# Patient Record
(demographics unavailable — no encounter records)

---

## 2024-12-12 NOTE — ASSESSMENT
[FreeTextEntry1] : 48 year F present with Left Middle ear effusion, bilateral SNHL, Bilateral tinnitus   9/6/24 Audiogram shows AD Mild SNHL 250-4kHz sloping to severe SNHL at 6kHz. No response at 8kHz. AS Moderate to severe mixed hearing loss 250-8kHz. AU Tymp A  9/6/24 Nasal endoscopy shows S-Shaped DNS, Moderate Inferior Turbinates Hypertrophy, No polyps, purulence or masses, Posterior Nasal pharynx Cobblestone/Clear Drainage, Moderate mucus production coating nasal cavity, Bilateral Eustachian tube opening clear  Personally reviewed Audiogram shows AU Hearing -4k hz sloping severe HF SNHL through 8k hz. AD Tymp Ad. AS Tymp A. Tinnitus Match AU 25db@3k hz  Recommend: Left Middle Ear Effusion -Resolved - Discussed at length that ear fluid itself is a result of a mechanical problem due to swelling and inflammation after URIs/Ear infections and that if not infected fluid that we often don't treat with antibiotics. -The underlying issues is Eustachian tube dysfunction which can be transient in which we just wait for viral illnesses to run their course. If the ETD is chronic that is when we discuss possible ear tubes. -Continue Flonase nasal sprays to decrease possible inflammation at the open of the eustachian tube on the side of the nose to allow for better drainage  SNHL -Discussed Benefit of Hearings Aids and their value of helping keep brain stimulated by helping hear conversation which keeps a person active and socially connected. Stressed also the association with a lower risk of incident dementia and slower cognitive decline. -[facility] Hearing Aid Evaluation and List of hearing aid Dispensary Given   Bilateral Tinnitus -Discussed that Hearing Aids may help with relieving some of the tinnitus. Tinnitus usually follows the same pattern of hearing loss and can be attributed to phantom sounds in the brain filling in for the loss of hearing in those particular frequencies -Discussed that Tinnitus usually can be attributed to phantom sounds in the brain filling in for sounds. If the tinnitus is brief only last for a few seconds with no loss of hearing associated. It is usually physiological and can be management conservatively -Discussed notched therapy, masking therapy, cognitive behavioral therapy, factors that may influence tinnitus, and discussed limited benefit of tinnitus supplements. -Tinnitus Hand Out Given -Patient states will try white noise machine   -Return to clinic 2 month or sooner if new/worsen symptoms present

## 2024-12-12 NOTE — REASON FOR VISIT
[Subsequent Evaluation] : a subsequent evaluation for [Family Member] : family member [FreeTextEntry2] : left ear infection

## 2024-12-12 NOTE — DATA REVIEWED
[de-identified] : An audiogram was ordered and performed including pure tones, tympanometry and speech testing for the patients complaint of hearing loss I have independently reviewed the patient's audiogram from today and my findings include  AU Hearing -4k hz sloping severe HF SNHL through 8k hz. AD Tymp Ad. AS Tymp A. Tinnitus Match AU 25db@3k hz

## 2024-12-12 NOTE — HISTORY OF PRESENT ILLNESS
[de-identified] : 48 year female follow up for Left DEION Patient hx 2004- earing stud getting stuck in skin of Left ear. Sates using Flonase and sinus rinse daily States ear fullness and otalgia improved States continues to have intermittent tinnitus, mild decreased hearing  Patient denies otorrhea, bleeding, hearing loss.

## 2025-03-26 NOTE — HISTORY OF PRESENT ILLNESS
[de-identified] : 48 year F following up for bilateral SNHL, Bilateral tinnitus Doing well with amplifiers from [employer] for hearing loss and noise cancellation of tinnitus States noise is more of a background noise now. Doesn't bother her as much Denies any recent ear, nose, throat infections

## 2025-03-26 NOTE — REASON FOR VISIT
[Subsequent Evaluation] : a subsequent evaluation for [FreeTextEntry2] : bilateral SNHL, Bilateral tinnitus

## 2025-03-26 NOTE — HISTORY OF PRESENT ILLNESS
[de-identified] : 48 year F following up for bilateral SNHL, Bilateral tinnitus Doing well with amplifiers from [employer] for hearing loss and noise cancellation of tinnitus States noise is more of a background noise now. Doesn't bother her as much Denies any recent ear, nose, throat infections

## 2025-03-26 NOTE — ASSESSMENT
[FreeTextEntry1] : 48 year F follow up for with bilateral SNHL, Bilateral tinnitus   9/6/24 Audiogram shows AD Mild SNHL 250-4kHz sloping to severe SNHL at 6kHz. No response at 8kHz. AS Moderate to severe mixed hearing loss 250-8kHz. AU Tymp A  9/6/24 Nasal endoscopy shows S-Shaped DNS, Moderate Inferior Turbinates Hypertrophy, No polyps, purulence or masses, Posterior Nasal pharynx Cobblestone/Clear Drainage, Moderate mucus production coating nasal cavity, Bilateral Eustachian tube opening clear  12/12/24 Audiogram shows AU Hearing -4k hz sloping severe HF SNHL through 8k hz. AD Tymp Ad. AS Tymp A. Tinnitus Match AU 25db@3k hz  2/17/2025 MRI shows No acute intracranial abnormality. Stable few punctate foci of signal alternation in the parietal lobe white matter, no nonspecific finding, which may be seen with sequalae of migraines  Recommend: SNHL -Discussed Benefit of Hearings Aids and their value of helping keep brain stimulated by helping hear conversation which keeps a person active and socially connected. Stressed also the association with a lower risk of incident dementia and slower cognitive decline. -Patient using amplifiers from [employer] for hearing and noise cancellation of tinnitus. Regular hearing aids too expensive.  Bilateral Tinnitus -Improved -Discussed that Hearing Aids may help with relieving some of the tinnitus. Tinnitus usually follows the same pattern of hearing loss and can be attributed to phantom sounds in the brain filling in for the loss of hearing in those particular frequencies -Discussed that Tinnitus usually can be attributed to phantom sounds in the brain filling in for sounds. If the tinnitus is brief only last for a few seconds with no loss of hearing associated. It is usually physiological and can be management conservatively -Discussed notched therapy, masking therapy, cognitive behavioral therapy, factors that may influence tinnitus, and discussed limited benefit of tinnitus supplements. -Tinnitus Hand Out Given -Patient states will try white noise machine  -Return to clinic annually to monitor hearing loss or sooner if new/worsen symptoms present

## 2025-03-26 NOTE — ASSESSMENT
[FreeTextEntry1] : 48 year F follow up for with bilateral SNHL, Bilateral tinnitus   9/6/24 Audiogram shows AD Mild SNHL 250-4kHz sloping to severe SNHL at 6kHz. No response at 8kHz. AS Moderate to severe mixed hearing loss 250-8kHz. AU Tymp A  9/6/24 Nasal endoscopy shows S-Shaped DNS, Moderate Inferior Turbinates Hypertrophy, No polyps, purulence or masses, Posterior Nasal pharynx Cobblestone/Clear Drainage, Moderate mucus production coating nasal cavity, Bilateral Eustachian tube opening clear  12/12/24 Audiogram shows AU Hearing -4k hz sloping severe HF SNHL through 8k hz. AD Tymp Ad. AS Tymp A. Tinnitus Match AU 25db@3k hz  2/17/2025 MRI shows No acute intracranial abnormality. Stable few punctate foci of signal alternation in the parietal lobe white matter, no nonspecific finding, which may be seen with sequalae of migraines  Recommend: SNHL -Discussed Benefit of Hearings Aids and their value of helping keep brain stimulated by helping hear conversation which keeps a person active and socially connected. Stressed also the association with a lower risk of incident dementia and slower cognitive decline. -Patient using amplifiers from [employer] for hearing and noise cancellation of tinnitus. Regular hearing aids too expensive.  Bilateral Tinnitus -Improved -Discussed that Hearing Aids may help with relieving some of the tinnitus. Tinnitus usually follows the same pattern of hearing loss and can be attributed to phantom sounds in the brain filling in for the loss of hearing in those particular frequencies -Discussed that Tinnitus usually can be attributed to phantom sounds in the brain filling in for sounds. If the tinnitus is brief only last for a few seconds with no loss of hearing associated. It is usually physiological and can be management conservatively -Discussed notched therapy, masking therapy, cognitive behavioral therapy, factors that may influence tinnitus, and discussed limited benefit of tinnitus supplements. -Tinnitus Hand Out Given -Patient states will try white noise machine  -Return to clinic annually to monitor hearing loss or sooner if new/worsen symptoms present

## 2025-05-23 NOTE — ASSESSMENT
[FreeTextEntry1] : A low acid / reflux diet was discussed in great detail including not smoking, not drinking alcohol, and not consuming foods that irritate the esophagus. It is helpful to eat small meals throughout the day instead of large meals. You should avoid eating before bedtime or lying down after you eat. It can be helpful to raise the head of your bed six inches. Additionally, you should maintain a healthy weight and good posture.. The patient was given written material to take home and review.  The patient mentioned feeling BLOATED   A low FODMAP diet was discussed with the patient at length. The patient had multiple questions all of which were answered. I recommended a nutritionist. Also recommended that the patient keep a food diary. We discussed  options such as Vegetables. Fresh fruits. Dairy that is lactose-free, and hard cheeses, or ripened/matured cheeses including... Beef, pork, chicken, fish, eggs. Avoid breadcrumbs, marinades, and sauces/gravies that may be high in FODMAPs. Soy products including tofu, tempeh. Grains.  The Patient mentioned being GASEOUS  We discussed Probiotics and limiting leafy vegetables and other changes  We discussed diarrhea at length. Treatment depends on the cause and severity of your diarrhea. You should drink plenty of fluids to avoid dehydration. You should avoid drinks that contain caffeine and milk. Milk may make diarrhea worse. Your doctor may recommend hydration drinks for your infant or child. People with severe dehydration may need fluid replacement via an IV line and hospitalization. Avoid eating greasy foods, fatty foods, and alcohol. Bananas, applesauce, rice, and toast are helpful foods to eat. If you feel too sick to eat, try sucking on ice chips until you can tolerate food.  EGD procedure ordered The risks benefits alternatives and complications of the procedure/s were explained to the patient at length. The patient was agreeable and we will proceed. Patient will need Internal medicine clearance prior to EGD. She agreed and will obtain.   Patient will need IBS panel including blood work and stool cultures. Instructions provided how to obtain sample and where to return specimen.   Patient will begin taking Omeprazole 40 mg PO in the AM and Famotidine 40 mg PO in the PM. Medications reviewed side effects and adverse reactions  Patient advised if s/s worsen or become severe to seek emergency treatment   Patient verbalized understanding of all information provided. All questions answered and reviewed.  I spent 45 minutes with the patient as well as reviewing documents prior to and after the office visit

## 2025-05-23 NOTE — HISTORY OF PRESENT ILLNESS
[FreeTextEntry1] : 49 year old female presents with complaints of generalized abdominal pain and change in bowel habits. Abdominal pain has been on going for a few weeks now. The pain is accompanied with nausea and vomiting, She has noticed at times her stool is loose than alternating to hard. She had a colonoscopy last year with a [place] [facility] [place] to recall his name or exact location. She remembers the result to be normal. No family hx of colon cancer. Denies rectal bleeding, blood in the stool, melena, or hematemesis. PMX Asthma that is controlled by her PCP Dr Katie Martinez in [place] on [facility]. She does admit to consuming several cups of coffee daily that may contribute to abdominal pain. She has never had a EGD.

## 2025-05-23 NOTE — HISTORY OF PRESENT ILLNESS
[FreeTextEntry1] : 49 year old female presents with complaints of generalized abdominal pain and change in bowel habits. Abdominal pain has been on going for a few weeks now. The pain is accompanied with nausea and vomiting, She has noticed at times her stool is loose than alternating to hard. She had a colonoscopy last year with a GI provider in [facility] [place] unable to recall his name or exact location. She remembers the result to be normal. No family hx of colon cancer. Denies rectal bleeding, blood in the stool, melena, or hematemesis. PMX Asthma that is controlled by her PCP Dr Katie Martinez in [place] on [facility]. She does admit to consuming several cups of coffee daily that may contribute to abdominal pain. She has never had a EGD.

## 2025-05-23 NOTE — PHYSICAL EXAM
[Alert] : alert [Normal Voice/Communication] : normal voice/communication [Healthy Appearing] : healthy appearing [No Acute Distress] : no acute distress [Sclera] : the sclera and conjunctiva were normal [Hearing Threshold Finger Rub Not [place]] : hearing was normal [Normal Lips/Gums] : the lips and gums were normal [Oropharynx] : the oropharynx was normal [Normal Appearance] : the appearance of the neck was normal [No Neck Mass] : no neck mass was observed [No Respiratory Distress] : no respiratory distress [No Acc Muscle Use] : no accessory muscle use [Respiration, Rhythm And Depth] : normal respiratory rhythm and effort [Auscultation Breath Sounds / Voice Sounds] : lungs were clear to auscultation bilaterally [Heart Rate And Rhythm] : heart rate was normal and rhythm regular [Normal S1, S2] : normal S1 and S2 [Murmurs] : no murmurs [Bowel Sounds] : normal bowel sounds [No Masses] : no abdominal mass palpated [Abdomen Soft] : soft [] : no hepatosplenomegaly [Other: ___] : [unfilled] [Oriented To Time, Place, And Person] : oriented to person, place, and time

## 2025-06-23 NOTE — PHYSICAL EXAM
[Alert] : alert [Normal Voice/Communication] : normal voice/communication [Healthy Appearing] : healthy appearing [No Acute Distress] : no acute distress [Sclera] : the sclera and conjunctiva were normal [Hearing Threshold Finger Rub Not [place]] : hearing was normal [Normal Lips/Gums] : the lips and gums were normal [Oropharynx] : the oropharynx was normal [Normal Appearance] : the appearance of the neck was normal [No Neck Mass] : no neck mass was observed [No Respiratory Distress] : no respiratory distress [No Acc Muscle Use] : no accessory muscle use [Respiration, Rhythm And Depth] : normal respiratory rhythm and effort [Auscultation Breath Sounds / Voice Sounds] : lungs were clear to auscultation bilaterally [Normal S1, S2] : normal S1 and S2 [Heart Rate And Rhythm] : heart rate was normal and rhythm regular [Murmurs] : no murmurs [Bowel Sounds] : normal bowel sounds [No Masses] : no abdominal mass palpated [Abdomen Soft] : soft [] : no hepatosplenomegaly [Epigastric] : epigastric [Oriented To Time, Place, And Person] : oriented to person, place, and time

## 2025-06-23 NOTE — HISTORY OF PRESENT ILLNESS
[FreeTextEntry1] : 49 year old female presents with complaints of generalized abdominal pain and change in bowel habits. Abdominal pain has been on going for a few weeks now. The pain is accompanied with nausea and vomiting, She has noticed at times her stool is loose than alternating to hard. She had a colonoscopy last year with a GI provider in [facility] [place] unable to recall his name or exact location. She remembers the result to be normal. No family hx of colon cancer. Denies rectal bleeding, blood in the stool, melena, or hematemesis. PMX Asthma that is controlled by her PCP Dr Katie Martinez in [place] on [facility]. She does admit to consuming several cups of coffee daily that may contribute to abdominal pain. She has never had a EGD.   6/23/25: Patient presents for follow up s/p EGD revealed small hiatal hernia and mild antral gastritis. Patient has been compliant with taking PPI and H2 blocker as prescribed but does admit to consuming triggers that may worsen or become severe. She continues to have irregular bowel movements. Fecal Calprotectin 200 ug/g.

## 2025-06-23 NOTE — ASSESSMENT
[FreeTextEntry1] : A low acid / reflux diet was discussed in great detail including not smoking, not drinking alcohol, and not consuming foods that irritate the esophagus. It is helpful to eat small meals throughout the day instead of large meals. You should avoid eating before bedtime or lying down after you eat. It can be helpful to raise the head of your bed six inches. Additionally, you should maintain a healthy weight and good posture.. The patient was given written material to take home and review.   Patient will continue to take PPI and H2 Blocker along with Sucralfate TID. Medication reviewed side effects and adverse reactions.   Patient will need CTE r/o small bowel disease. The risks benefits alternatives and complications of the procedure/s were explained to the patient at length. The patient was agreeable and we will proceed.   Patient will f/u for OV after completion of imaging  Patient advised if s/s worsen or become severe to call office immediately  Patient verbalized understanding of all information all information provided. All questions answered and reviewed.  I spent 35 minutes with the patient as well as reviewing documents prior to and after the office visit

## 2025-07-16 NOTE — ASSESSMENT
[FreeTextEntry1] : The patient mentioned feeling BLOATED   A low FODMAP diet was discussed with the patient at length. The patient had multiple questions all of which were answered. I recommended a nutritionist. Also recommended that the patient keep a food diary. We discussed  options such as Vegetables. Fresh fruits. Dairy that is lactose-free, and hard cheeses, or ripened/matured cheeses including... Beef, pork, chicken, fish, eggs. Avoid breadcrumbs, marinades, and sauces/gravies that may be high in FODMAPs. Soy products including tofu, tempeh. Grains.  The Patient mentioned being GASEOUS  We discussed Probiotics and limiting leafy vegetables and other changes The patient mentioned some GERD We discussed a low acid , high protien diet . The patient said their is occasional CONSTIPATION  We discussed the proper use of fiber and water intake   The above medical issues were discussed in detail and all questions answered over a 35 minute period.   Patient will try to avoid triggers  She will begin to take Probiotic daily Align or Culturelle   She will continue with PPI and H2 blocker and Sucralfate  Patient will f/u for OV in 6 months.   Patient advised if s/s worsen to call office immediaely  Patient verbalized understanding of all information provided. All questions answsered and reviewed.  I spent 35 minutes with the patient as well as reviewing documents prior to and after the office visit

## 2025-07-16 NOTE — HISTORY OF PRESENT ILLNESS
[FreeTextEntry1] : 49 year old female presents with complaints of generalized abdominal pain and change in bowel habits. Abdominal pain has been on going for a few weeks now. The pain is accompanied with nausea and vomiting, She has noticed at times her stool is loose than alternating to hard. She had a colonoscopy last year with a GI provider in [facility] [place] [place] unable to recall his name or exact location. She remembers the result to be normal. No family hx of colon cancer. Denies rectal bleeding, blood in the stool, melena, or hematemesis. PMX Asthma that is controlled by her PCP Dr Katie Martinez in [place] on [facility]. She does admit to consuming several cups of coffee daily that may contribute to abdominal pain. She has never had a EGD.   6/23/25: Patient presents for follow up s/p EGD revealed small hiatal hernia and mild antral gastritis. Patient has been compliant with taking PPI and H2 blocker as prescribed but does admit to consuming triggers that may worsen or become severe. She continues to have irregular bowel movements. Fecal Calprotectin 200 ug/g.  7/16/25: Patient presents for follow up. CTE preformed 7/9/25 was negative. She does continue to suffer from IBS bowel movements alternate between diarrhea and constipation. She still suffers from gastritis but does consume triggers that may exacerbate symptoms. She takes PPI and H2 blocker along with sucralfate but has episodes of intermittent increase symptoms.